# Patient Record
Sex: FEMALE
[De-identification: names, ages, dates, MRNs, and addresses within clinical notes are randomized per-mention and may not be internally consistent; named-entity substitution may affect disease eponyms.]

---

## 2022-11-20 ENCOUNTER — NURSE TRIAGE (OUTPATIENT)
Dept: OTHER | Facility: CLINIC | Age: 37
End: 2022-11-20

## 2022-11-20 NOTE — TELEPHONE ENCOUNTER
Location of patient: [place]    Subjective: Caller states \"Early Friday morning I woke with what felt like \"labor\" pains, tried to go to the bathroom but I had difficulty walking to bathroom. My whole stomach was burning during urination, did not have a BM. \"     Current Symptoms: Stomach pain, burning anytime pt has to urinate or have a BM, now radiating to bilateral mid - back    Pt denies CP, SOB, or difficulty breathing. Onset: 2 days ago; sudden    Associated Symptoms: reduced activity, reduced appetite, difficulty sleeping due to pain and episodes of incontinence    Pain Severity: 4/10; dull, aching; constant - states pain in back; pain is worse when going to the bathroom, intermittent burning, cramping pain in generalized stomach 7-8/10    Temperature: na  denies fever    What has been tried: Tylenol    LMP:  11/1/2022  Pregnant: Unknown    Recommended disposition: See HCP within 4 Hours (or PCP triage)    Care advice provided, patient verbalizes understanding; denies any other questions or concerns; instructed to call back for any new or worsening symptoms. Patient/caller agrees to proceed to nearest [facility]     This triage is a result of a call to [address] [place] [address]. Please do not respond to the triage nurse through this encounter. Any subsequent communication should be directly with the patient.     Reason for Disposition   [1] MILD-MODERATE pain AND [2] constant AND [3] present > 2 hours    Protocols used: Abdominal Pain - Female-ADULT-